# Patient Record
(demographics unavailable — no encounter records)

---

## 2025-07-21 NOTE — REASON FOR VISIT
[Consultation] : a consultation visit [FreeTextEntry1] : The patient is a 43-year-old female who presents today for initial consultation regarding breast reconstruction. The patient was diagnosed with right breast DCIS Stage 0, which was found through her routine mammogram followed by a biopsy done at [facility]. Patient denies having hx or family hx of breast cancer. Patient denies having breast pain, nipple discharge, nipple inversion or palpable mass.  Patient has 2 children natural birth, she denies having hx of miscarriage. Patient current bra size is a 36DD. The patient's surgical option will be bilateral mastectomy with EUNICE flap.

## 2025-07-21 NOTE — REASON FOR VISIT
[Consultation] : a consultation visit [FreeTextEntry1] : The patient is a 43-year-old female who presents today for initial consultation regarding breast reconstruction. The patient was diagnosed with right breast DCIS Stage 0, which was found through her routine mammogram followed by a biopsy done at [facility]. Patient denies having hx or family hx of breast cancer. Patient denies having breast pain, nipple discharge, nipple inversion or palpable mass.  Patient has 2 children natural birth, she denies having hx of miscarriage. Patient current bra size is a 36DD. The patient's surgical option will be bilateral mastectomy with EUNICE flap.